# Patient Record
Sex: MALE
[De-identification: names, ages, dates, MRNs, and addresses within clinical notes are randomized per-mention and may not be internally consistent; named-entity substitution may affect disease eponyms.]

---

## 2021-11-20 ENCOUNTER — NURSE TRIAGE (OUTPATIENT)
Dept: OTHER | Facility: CLINIC | Age: 61
End: 2021-11-20

## 2021-11-20 NOTE — TELEPHONE ENCOUNTER
Brief description of triage: left knee injury last Saturday, still severe pain when putting weight on it. Triage indicates for patient to go to urgent care. Care advice provided, patient verbalizes understanding; denies any other questions or concerns; instructed to call back for any new or worsening symptoms. This triage is a result of a call to [address] [place] [address]. Please do not respond to the triage nurse through this encounter. Any subsequent communication should be directly with the patient. Reason for Disposition   [1] High-risk adult (e.g., age > 61, osteoporosis, chronic steroid use) AND [2] limping    Answer Assessment - Initial Assessment Questions  1. MECHANISM: \"How did the injury happen? \" (e.g., twisting injury, direct blow)       Tripped and hit ground with knee    2. ONSET: \"When did the injury happen? \" (Minutes or hours ago)       Last Saturday    3. LOCATION: \"Where is the injury located? \"       Left knee    4. APPEARANCE of INJURY: \"What does the injury look like? \"       No bruising, some swelling, a couple scrapes    5. SEVERITY: \"Can you put weight on that leg? \" \"Can you walk? \"       Trouble walking, can put a little weight on it with a cane    6. SIZE: For cuts, bruises, or swelling, ask: \"How large is it? \" (e.g., inches or centimeters;  entire joint)       N/A    7. PAIN: \"Is there pain? \" If so, ask: \"How bad is the pain? \"    (e.g., Scale 1-10; or mild, moderate, severe)      Mostly 8/10, 9/10 when trying to walk    8. TETANUS: For any breaks in the skin, ask: \"When was the last tetanus booster?\"      2 years    9. OTHER SYMPTOMS: \"Do you have any other symptoms? \"  (e.g., \"pop\" when knee injured, swelling, locking, buckling)       Pain    10. PREGNANCY: \"Is there any chance you are pregnant? \" \"When was your last menstrual period? \"        N/A    Protocols used: KNEE INJURY-ADULT-